# Patient Record
Sex: FEMALE | Race: WHITE | NOT HISPANIC OR LATINO | Employment: UNEMPLOYED | URBAN - METROPOLITAN AREA
[De-identification: names, ages, dates, MRNs, and addresses within clinical notes are randomized per-mention and may not be internally consistent; named-entity substitution may affect disease eponyms.]

---
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
## 2020-02-12 LAB
ABORH_EXT (HISTORICAL CONVERSION): NORMAL
ANTIBODY_EXT (HISTORICAL CONVERSION): NORMAL
HBSAG_EXT (HISTORICAL CONVERSION): NORMAL
HCV AB SERPL QL IA: NORMAL
HGB_EXT (HISTORICAL CONVERSION): 12.5
HIV_EXT: NORMAL
PLT_EXT - HISTORICAL: 252
RPR - HISTORICAL: NORMAL
RUBELLA_EXT (HISTORICAL CONVERSION): NORMAL

## 2020-08-27 LAB — GBS_EXT (HISTORICAL CONVERSION): NEGATIVE

## 2020-09-16 ENCOUNTER — HOSPITAL ENCOUNTER (OUTPATIENT)
Dept: OBGYN | Facility: CLINIC | Age: 20
Discharge: HOME | End: 2020-09-16
Attending: ADVANCED PRACTICE MIDWIFE | Admitting: ADVANCED PRACTICE MIDWIFE

## 2020-09-16 LAB — RUPTURE OF FETAL MEMBRANES BY ROM PLUS: NEGATIVE

## 2020-09-16 RX ORDER — SWAB
1 SWAB, NON-MEDICATED MISCELLANEOUS DAILY
Status: SHIPPED | COMMUNITY
Start: 2020-09-16

## 2020-09-21 ENCOUNTER — RECORDS - HEALTHEAST (OUTPATIENT)
Dept: ADMINISTRATIVE | Facility: OTHER | Age: 20
End: 2020-09-21

## 2020-09-29 ENCOUNTER — COMMUNICATION - HEALTHEAST (OUTPATIENT)
Dept: SCHEDULING | Facility: CLINIC | Age: 20
End: 2020-09-29

## 2021-06-11 NOTE — PROGRESS NOTES
Pt presents to unit with c/o contractions that began around 11:30pm and have since increased in frequency and intensity. Upon arrival, pt is breathing through contractions, but can still talk as well. FM+. Pt also reports that she has noticed some fluid leaking with some strong contractions since they began last night. Pt placed on EFM. ROM+ collected and sent. SVE reveals 3/85/0, small amount of fluid noted with exam.     There is a note in patient's prenatal stating to contact Babs العلي CNM when in labor. Plan to page her, if no response will contact on call provider.

## 2021-06-16 PROBLEM — Z34.90 PREGNANT: Status: ACTIVE | Noted: 2020-09-28

## 2022-03-05 ENCOUNTER — NURSE TRIAGE (OUTPATIENT)
Dept: NURSING | Facility: CLINIC | Age: 22
End: 2022-03-05
Payer: COMMERCIAL

## 2022-03-05 ENCOUNTER — OFFICE VISIT (OUTPATIENT)
Dept: URGENT CARE | Facility: URGENT CARE | Age: 22
End: 2022-03-05
Payer: COMMERCIAL

## 2022-03-05 VITALS
SYSTOLIC BLOOD PRESSURE: 135 MMHG | TEMPERATURE: 97.4 F | DIASTOLIC BLOOD PRESSURE: 77 MMHG | BODY MASS INDEX: 20.2 KG/M2 | HEART RATE: 88 BPM | OXYGEN SATURATION: 100 % | WEIGHT: 129 LBS

## 2022-03-05 DIAGNOSIS — R68.84 JAW PAIN: ICD-10-CM

## 2022-03-05 DIAGNOSIS — K12.2 INFECTION OF MOUTH: Primary | ICD-10-CM

## 2022-03-05 PROBLEM — J30.2 SEASONAL ALLERGIC RHINITIS: Status: ACTIVE | Noted: 2022-03-05

## 2022-03-05 PROCEDURE — 99203 OFFICE O/P NEW LOW 30 MIN: CPT

## 2022-03-05 RX ORDER — HYDROCODONE BITARTRATE AND ACETAMINOPHEN 5; 325 MG/1; MG/1
1 TABLET ORAL 4 TIMES DAILY PRN
COMMUNITY
Start: 2022-02-19

## 2022-03-05 NOTE — PROGRESS NOTES
Assessment & Plan     Jaw pain  Continue with Tylenol 3 times a day with food  May add in acetaminophen as well  Ice as needed    Infection of mouth  We will treat with Augmentin twice daily for 7 days  Keep follow-up appointment with dentist on Monday    - amoxicillin-clavulanate (AUGMENTIN) 875-125 MG tablet; Take 1 tablet by mouth 2 times daily for 7 days             Dental appointment Monday March 7    No follow-ups on file.    [facility]  [facility] [place]    Reji Ridley is a 21 year old who presents for the following health issues     HPI     Concern - R Jaw pain  Onset: 2 days  Description: Right lower jaw pain.  She had her right lower wisdom tooth removed a week ago.  Yesterday the pain and swelling were getting worse.  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  Intensity: Increasing  Progression of Symptoms:  worsening  Accompanying Signs & Symptoms: Swelling  Previous history of similar problem: No  Precipitating factors:        Worsened by: Chewing  Alleviating factors:        Improved by: Ice and ibuprofen  Therapies tried and outcome: See above    No fevers or chills.  No sore throat.  No nausea.        Review of Systems   Negative except as listed in HPI      Objective    /77 (BP Location: Right arm)   Pulse 88   Temp 97.4  F (36.3  C)   Wt 58.5 kg (129 lb)   SpO2 100%   BMI 20.20 kg/m    Body mass index is 20.2 kg/m .  Physical Exam   Vitals noted and within normal limits  In general she is alert, oriented, and in no acute distress  Ears canals patent, TMs intact, no erythema no bulging  Mouth mucous members are pink and moist and pharynx is not erythematous  No tenderness to tapping of the right lower jaw teeth  Gingiva not inflamed  Swelling at the right jawline  Neck supple with no cervical  lymphadenopathy  Breathing not labored

## 2022-03-05 NOTE — TELEPHONE ENCOUNTER
"Incoming red flag call from Central Scheduling.     Keyana calling this morning with increased pain and swelling on lower right jaw. Patient had a tooth extraction last Saturday (1 week ago) and called the dentist yesterday. She wasn't able to get in to be seen yesterday and does have an appointment on Monday. \"I've been in a lot of pain\" and swelling is noticeable this morning. Keyana has been applying ice and taking Ibuprofen with minimal to no relief. Patient denies breathing difficulty and tongue swelling. She is able to swallow liquids and unsure if she has a fever, although she had chills last night. Keyana is otherwise awake, alert, and responding appropriately.     Triage guidelines recommend to see a provider within 4 hours. FNA RN advised to       RN advised to call back with any changes, worsening of symptoms, and questions or concerns. Patient verbalized understanding of and agreement with plan and had no further questions.     Additional Information    Negative: Severe difficulty breathing (e.g., struggling for each breath, speaks in single words, stridor)    Negative: Sounds like a life-threatening emergency to the triager    Negative: [1] Drooling or spitting out saliva (because can't swallow) AND [2] new onset    Negative: Electrical burn of mouth    Negative: Chemical burn of mouth    Negative: Tongue is very swollen and tender    Negative: [1] Difficulty breathing AND [2] not severe    Negative: [1] Face is swollen AND [2] fever    Negative: [1] Drinking very little AND [2] dehydration suspected (e.g., no urine > 12 hours, very dry mouth, very lightheaded)    Negative: Patient sounds very sick or weak to the triager    Protocols used: MOUTH PAIN-A-JENI Montelongo, RN-BSN  [facility]   "

## 2022-04-30 ENCOUNTER — HEALTH MAINTENANCE LETTER (OUTPATIENT)
Age: 22
End: 2022-04-30

## 2022-07-30 ENCOUNTER — HOSPITAL ENCOUNTER (EMERGENCY)
Facility: CLINIC | Age: 22
Discharge: HOME | End: 2022-07-30
Attending: PHYSICIAN ASSISTANT | Admitting: PHYSICIAN ASSISTANT
Payer: COMMERCIAL

## 2022-07-30 VITALS
OXYGEN SATURATION: 94 % | TEMPERATURE: 100.4 F | SYSTOLIC BLOOD PRESSURE: 128 MMHG | HEART RATE: 84 BPM | WEIGHT: 124 LBS | BODY MASS INDEX: 19.42 KG/M2 | RESPIRATION RATE: 17 BRPM | DIASTOLIC BLOOD PRESSURE: 73 MMHG

## 2022-07-30 DIAGNOSIS — K04.7 DENTAL ABSCESS: ICD-10-CM

## 2022-07-30 PROCEDURE — 99284 EMERGENCY DEPT VISIT MOD MDM: CPT | Mod: 25 | Performed by: PHYSICIAN ASSISTANT

## 2022-07-30 PROCEDURE — 64400 NJX AA&/STRD TRIGEMINAL NRV: CPT | Mod: RT | Performed by: PHYSICIAN ASSISTANT

## 2022-07-30 RX ORDER — PENICILLIN V POTASSIUM 500 MG/1
500 TABLET, FILM COATED ORAL 4 TIMES DAILY
Qty: 40 TABLET | Refills: 0 | Status: SHIPPED | OUTPATIENT
Start: 2022-07-30 | End: 2022-08-09

## 2022-07-30 NOTE — ED PROVIDER NOTES
History     Chief Complaint   Patient presents with     Dental Pain       HPI  Keyana Momin is a 22 year old female who presents to the emergency department complaining of dental pain. The patient reports several months ago she had a cap placed on a decaying tooth in the right lower jawline.  She had been put on antibiotics within a week of the cap being placed and since then had been doing well.  In the last couple weeks she has noticed that her tooth started aching again and in the last 2 days she has had increased pain with some lower jaw swelling and fevers.  She has been taking Tylenol and ibuprofen alternating.  She has an appointment in 3 days with her dentist to have this tooth relooked but due to severity of pain came here for assessment.  She has felt some nausea but no vomiting.  Otherwise fairly healthy.        Allergies:  No Known Allergies    Problem List:    Patient Active Problem List    Diagnosis Date Noted     Seasonal allergic rhinitis 03/05/2022     Priority: Medium     Pregnant 09/28/2020     Priority: Medium        Past Medical History:    No past medical history on file.    Past Surgical History:    Past Surgical History:   Procedure Laterality Date     APPENDECTOMY  2014       Family History:    No family history on file.    Social History:  Marital Status:   [2]  Social History     Tobacco Use     Smoking status: Never Smoker     Smokeless tobacco: Never Used   Substance Use Topics     Alcohol use: Not Currently     Drug use: Never        Medications:    penicillin V (VEETID) 500 MG tablet  docusate sodium (COLACE) 100 MG capsule  HYDROcodone-acetaminophen (NORCO) 5-325 MG tablet  ibuprofen (ADVIL,MOTRIN) 800 MG tablet  prenatal vitamin iron-folic acid 27mg-0.8mg (PRENATAL S) 27 mg iron- 800 mcg Tab tablet          Review of Systems   All other systems reviewed and are negative.      Physical Exam   BP: 128/73  Pulse: 84  Temp: 100.4  F (38  C)  Resp: 17  Weight: 56.2 kg (124  lb)  SpO2: 94 %      Physical Exam  Vitals and nursing note reviewed.   Constitutional:       General: She is not in acute distress.     Appearance: Normal appearance. She is not ill-appearing, toxic-appearing or diaphoretic.   HENT:      Head: Normocephalic and atraumatic.      Nose: Nose normal.      Mouth/Throat:      Mouth: Mucous membranes are moist.      Pharynx: Oropharynx is clear.      Comments: Tooth #30 with filling in place but surrounding gingival inflammation and swelling.  There is right lower mandibular swelling with lymphadenopathy present.  No evidence of Trino's angina.  Eyes:      Extraocular Movements: Extraocular movements intact.      Conjunctiva/sclera: Conjunctivae normal.      Pupils: Pupils are equal, round, and reactive to light.   Pulmonary:      Effort: Pulmonary effort is normal. No respiratory distress.   Musculoskeletal:      Cervical back: Neck supple.   Skin:     General: Skin is warm and dry.   Neurological:      General: No focal deficit present.      Mental Status: She is alert and oriented to person, place, and time. Mental status is at baseline.   Psychiatric:         Mood and Affect: Mood normal.         Behavior: Behavior normal.           ED Course           Procedures      Right Inferior Alveolar Block:    The area of the injection was identified. The medicine used was bupivacaine with epinephrine.    Topical anesthetic was placed in the area of injection prior to injection and left in place for a short time.  The needle was inserted into the tissue and the plunger drawn back to look for a flash of blood.    There was no blood flash and the medicine was injected slowly.    They tolerated this well.          No results found for this or any previous visit (from the past 24 hour(s)).    Medications   dental kit ([facility]) (Dental Kit) 1 KIT kit (has no administration in time range)          Assessments & Plan (with Medical Decision Making)  Keyana Momin is a 22 year old female  who presented to the ED complaining of right lower dental pain.  History of issues with same tooth.  On arrival to the ED she had a low-grade temp of 100.4  F but otherwise normal vital signs.  She did not appear acutely ill or toxic.  Exam did demonstrate tenderness with gingival swelling at tooth #30 with right lower mandibular swelling and lymphadenopathy present.  Findings are consistent with underlying dental infection.  I discussed pain management options with the patient and she was agreeable to a dental block.  This was performed as detailed above with excellent results.  Patient will be prescribed penicillin for treatment of underlying dental abscess.  She has appropriate follow-up in the next few days for definitive management of this issue.  She was given instructions on when to return to the ED.  All questions answered and patient discharged home in suitable condition.     I have reviewed the nursing notes.    I have reviewed the findings, diagnosis, plan and need for follow up with the patient.    New Prescriptions    PENICILLIN V (VEETID) 500 MG TABLET    Take 1 tablet (500 mg) by mouth 4 times daily for 10 days       Final diagnoses:   Dental abscess     Note: Chart documentation done in part with Dragon Voice Recognition software. Although reviewed after completion, some word and grammatical errors may remain.     7/30/2022   [facility] EMERGENCY DEPT     Abby Mattson PA-C  07/30/22 [id number]

## 2022-07-30 NOTE — DISCHARGE INSTRUCTIONS
Please take the full course of antibiotics as prescribed.  Use ibuprofen or Tylenol for pain.  Follow-up at your scheduled dentist appointment.  If you have any worsening symptoms please do not hesitate to return to the emergency department.    Thank you for choosing [facility]'s Emergency Department. It was a pleasure taking care of you today. If you have any questions, please call [phone].    Abby Mattson PA-C

## 2022-07-30 NOTE — ED TRIAGE NOTES
Pt reports persistent severe pain in right side of mouth with recent cap placed over decaying tooth. Pt reports progression of infectious-symptoms with mild drainage and puss pockets. C/o chills and fevers now, last does of Tylenol at 1230.     Triage Assessment     Row Name 07/30/22 1517       Triage Assessment (Adult)    Airway WDL WDL       Respiratory WDL    Respiratory WDL WDL       Skin Circulation/Temperature WDL    Skin Circulation/Temperature WDL WDL       Cardiac WDL    Cardiac WDL WDL       Peripheral/Neurovascular WDL    Peripheral Neurovascular WDL WDL       Cognitive/Neuro/Behavioral WDL    Cognitive/Neuro/Behavioral WDL WDL

## 2022-11-19 ENCOUNTER — HEALTH MAINTENANCE LETTER (OUTPATIENT)
Age: 22
End: 2022-11-19

## 2023-06-01 ENCOUNTER — HEALTH MAINTENANCE LETTER (OUTPATIENT)
Age: 23
End: 2023-06-01

## 2024-06-16 ENCOUNTER — HEALTH MAINTENANCE LETTER (OUTPATIENT)
Age: 24
End: 2024-06-16

## 2025-06-21 ENCOUNTER — HEALTH MAINTENANCE LETTER (OUTPATIENT)
Age: 25
End: 2025-06-21